# Patient Record
Sex: MALE | Race: WHITE | NOT HISPANIC OR LATINO
[De-identification: names, ages, dates, MRNs, and addresses within clinical notes are randomized per-mention and may not be internally consistent; named-entity substitution may affect disease eponyms.]

---

## 2017-01-23 ENCOUNTER — APPOINTMENT (OUTPATIENT)
Dept: CARDIOLOGY | Facility: CLINIC | Age: 82
End: 2017-01-23

## 2017-03-20 ENCOUNTER — APPOINTMENT (OUTPATIENT)
Dept: UROLOGY | Facility: CLINIC | Age: 82
End: 2017-03-20

## 2017-03-20 VITALS
SYSTOLIC BLOOD PRESSURE: 110 MMHG | HEIGHT: 72 IN | WEIGHT: 205 LBS | HEART RATE: 70 BPM | BODY MASS INDEX: 27.77 KG/M2 | DIASTOLIC BLOOD PRESSURE: 58 MMHG

## 2017-03-20 DIAGNOSIS — G40.909 EPILEPSY, UNSPECIFIED, NOT INTRACTABLE, W/OUT STATUS EPILEPTICUS: ICD-10-CM

## 2017-03-20 DIAGNOSIS — Z86.73 PERSONAL HISTORY OF TRANSIENT ISCHEMIC ATTACK (TIA), AND CEREBRAL INFARCTION W/OUT RESIDUAL DEFICITS: ICD-10-CM

## 2017-03-20 DIAGNOSIS — Z86.69 PERSONAL HISTORY OF OTHER DISEASES OF THE NERVOUS SYSTEM AND SENSE ORGANS: ICD-10-CM

## 2017-03-20 DIAGNOSIS — Z87.891 PERSONAL HISTORY OF NICOTINE DEPENDENCE: ICD-10-CM

## 2017-03-20 RX ORDER — ERGOCALCIFEROL 1.25 MG/1
1.25 MG CAPSULE, LIQUID FILLED ORAL
Qty: 4 | Refills: 0 | Status: DISCONTINUED | COMMUNITY
Start: 2016-10-07

## 2017-03-20 RX ORDER — ASPIRIN 81 MG
81 TABLET, DELAYED RELEASE (ENTERIC COATED) ORAL
Refills: 0 | Status: ACTIVE | COMMUNITY

## 2017-03-20 RX ORDER — LANCETS 28 GAUGE
EACH MISCELLANEOUS
Qty: 100 | Refills: 0 | Status: DISCONTINUED | COMMUNITY
Start: 2016-06-21

## 2017-03-20 RX ORDER — IPRATROPIUM BROMIDE 42 UG/1
0.06 SPRAY NASAL
Qty: 15 | Refills: 0 | Status: DISCONTINUED | COMMUNITY
Start: 2016-05-17

## 2017-03-20 RX ORDER — BLOOD SUGAR DIAGNOSTIC
STRIP MISCELLANEOUS
Qty: 100 | Refills: 0 | Status: DISCONTINUED | COMMUNITY
Start: 2016-06-21

## 2017-03-21 LAB
APPEARANCE: CLEAR
BACTERIA: ABNORMAL
BILIRUBIN URINE: NEGATIVE
BLOOD URINE: NEGATIVE
CALCIUM OXALATE CRYSTALS: ABNORMAL
COLOR: ABNORMAL
GLUCOSE QUALITATIVE U: NORMAL MG/DL
HYALINE CASTS: 0 /LPF
KETONES URINE: ABNORMAL
LEUKOCYTE ESTERASE URINE: NEGATIVE
MICROSCOPIC-UA: NORMAL
NITRITE URINE: NEGATIVE
PH URINE: 6.5
PROTEIN URINE: 30 MG/DL
RED BLOOD CELLS URINE: 2 /HPF
SPECIFIC GRAVITY URINE: 1.02
SQUAMOUS EPITHELIAL CELLS: 7 /HPF
UROBILINOGEN URINE: 1 MG/DL
WHITE BLOOD CELLS URINE: 2 /HPF

## 2017-03-22 LAB
BACTERIA UR CULT: ABNORMAL
CORE LAB FLUID CYTOLOGY: NORMAL

## 2017-04-13 ENCOUNTER — APPOINTMENT (OUTPATIENT)
Dept: UROLOGY | Facility: CLINIC | Age: 82
End: 2017-04-13

## 2017-04-26 ENCOUNTER — APPOINTMENT (OUTPATIENT)
Dept: CARDIOLOGY | Facility: CLINIC | Age: 82
End: 2017-04-26

## 2017-04-26 ENCOUNTER — MEDICATION RENEWAL (OUTPATIENT)
Age: 82
End: 2017-04-26

## 2017-05-01 ENCOUNTER — APPOINTMENT (OUTPATIENT)
Dept: CARDIOLOGY | Facility: CLINIC | Age: 82
End: 2017-05-01

## 2017-05-02 ENCOUNTER — MEDICATION RENEWAL (OUTPATIENT)
Age: 82
End: 2017-05-02

## 2017-05-31 ENCOUNTER — EMERGENCY (EMERGENCY)
Facility: HOSPITAL | Age: 82
LOS: 1 days | End: 2017-05-31
Payer: MEDICARE

## 2017-05-31 PROCEDURE — 71020: CPT | Mod: 26

## 2017-05-31 PROCEDURE — 70450 CT HEAD/BRAIN W/O DYE: CPT | Mod: 26

## 2017-05-31 PROCEDURE — 73562 X-RAY EXAM OF KNEE 3: CPT | Mod: 26,RT

## 2017-05-31 PROCEDURE — 99284 EMERGENCY DEPT VISIT MOD MDM: CPT

## 2017-07-06 ENCOUNTER — APPOINTMENT (OUTPATIENT)
Dept: UROLOGY | Facility: CLINIC | Age: 82
End: 2017-07-06

## 2017-08-02 ENCOUNTER — APPOINTMENT (OUTPATIENT)
Dept: CARDIOLOGY | Facility: CLINIC | Age: 82
End: 2017-08-02
Payer: MEDICARE

## 2017-08-02 PROCEDURE — ZZZZZ: CPT

## 2017-08-02 PROCEDURE — 85610 PROTHROMBIN TIME: CPT | Mod: QW

## 2017-08-02 PROCEDURE — 99214 OFFICE O/P EST MOD 30 MIN: CPT | Mod: 25

## 2017-08-02 PROCEDURE — 93289 INTERROG DEVICE EVAL HEART: CPT

## 2017-08-30 ENCOUNTER — APPOINTMENT (OUTPATIENT)
Dept: CARDIOLOGY | Facility: CLINIC | Age: 82
End: 2017-08-30

## 2017-09-21 ENCOUNTER — EMERGENCY (EMERGENCY)
Facility: HOSPITAL | Age: 82
LOS: 1 days | End: 2017-09-21
Payer: MEDICARE

## 2017-09-21 PROCEDURE — 74000: CPT | Mod: 26

## 2017-09-21 PROCEDURE — 99284 EMERGENCY DEPT VISIT MOD MDM: CPT

## 2017-10-09 ENCOUNTER — RECORD ABSTRACTING (OUTPATIENT)
Age: 82
End: 2017-10-09

## 2017-10-09 DIAGNOSIS — Z87.898 PERSONAL HISTORY OF OTHER SPECIFIED CONDITIONS: ICD-10-CM

## 2017-10-09 DIAGNOSIS — I25.2 OLD MYOCARDIAL INFARCTION: ICD-10-CM

## 2017-10-09 DIAGNOSIS — Z98.890 OTHER SPECIFIED POSTPROCEDURAL STATES: ICD-10-CM

## 2017-10-09 DIAGNOSIS — Z86.79 PERSONAL HISTORY OF OTHER DISEASES OF THE CIRCULATORY SYSTEM: ICD-10-CM

## 2017-10-09 DIAGNOSIS — Z86.39 PERSONAL HISTORY OF OTHER ENDOCRINE, NUTRITIONAL AND METABOLIC DISEASE: ICD-10-CM

## 2017-10-09 DIAGNOSIS — G30.9 ALZHEIMER'S DISEASE, UNSPECIFIED: ICD-10-CM

## 2017-10-30 ENCOUNTER — MEDICATION RENEWAL (OUTPATIENT)
Age: 82
End: 2017-10-30

## 2017-10-30 ENCOUNTER — APPOINTMENT (OUTPATIENT)
Dept: UROLOGY | Facility: CLINIC | Age: 82
End: 2017-10-30

## 2017-11-10 ENCOUNTER — RESULT REVIEW (OUTPATIENT)
Age: 82
End: 2017-11-10

## 2017-11-10 LAB
INR PPP: 4.38
PT BLD: 53.2

## 2017-11-13 ENCOUNTER — RESULT REVIEW (OUTPATIENT)
Age: 82
End: 2017-11-13

## 2017-11-14 LAB
INR PPP: 1.51
PT BLD: 17.8

## 2017-11-16 ENCOUNTER — RESULT REVIEW (OUTPATIENT)
Age: 82
End: 2017-11-16

## 2017-11-16 LAB
INR PPP: 2.3
PT BLD: 27.4

## 2017-11-17 ENCOUNTER — RESULT REVIEW (OUTPATIENT)
Age: 82
End: 2017-11-17

## 2017-11-22 ENCOUNTER — RESULT REVIEW (OUTPATIENT)
Age: 82
End: 2017-11-22

## 2017-11-22 LAB
INR PPP: 3.77
PT BLD: 45.6

## 2017-11-28 LAB
INR PPP: 1.78
PT BLD: 21

## 2017-11-29 ENCOUNTER — APPOINTMENT (OUTPATIENT)
Dept: CARDIOLOGY | Facility: CLINIC | Age: 82
End: 2017-11-29
Payer: MEDICARE

## 2017-11-29 LAB
INR PPP: 1.6 RATIO
POCT-PROTHROMBIN TIME: 19.7 SECS

## 2017-11-29 PROCEDURE — 85610 PROTHROMBIN TIME: CPT | Mod: QW

## 2017-12-07 ENCOUNTER — APPOINTMENT (OUTPATIENT)
Dept: CARDIOLOGY | Facility: CLINIC | Age: 82
End: 2017-12-07

## 2017-12-15 ENCOUNTER — APPOINTMENT (OUTPATIENT)
Dept: CARDIOLOGY | Facility: CLINIC | Age: 82
End: 2017-12-15
Payer: MEDICARE

## 2017-12-15 ENCOUNTER — RESULT REVIEW (OUTPATIENT)
Age: 82
End: 2017-12-15

## 2017-12-15 VITALS
DIASTOLIC BLOOD PRESSURE: 50 MMHG | SYSTOLIC BLOOD PRESSURE: 92 MMHG | HEIGHT: 74 IN | OXYGEN SATURATION: 99 % | BODY MASS INDEX: 28.23 KG/M2 | WEIGHT: 220 LBS | HEART RATE: 85 BPM

## 2017-12-15 PROCEDURE — 99214 OFFICE O/P EST MOD 30 MIN: CPT

## 2017-12-15 PROCEDURE — 93284 PRGRMG EVAL IMPLANTABLE DFB: CPT

## 2017-12-21 ENCOUNTER — APPOINTMENT (OUTPATIENT)
Dept: CARDIOLOGY | Facility: CLINIC | Age: 82
End: 2017-12-21
Payer: MEDICARE

## 2017-12-21 PROCEDURE — 85610 PROTHROMBIN TIME: CPT | Mod: QW

## 2017-12-27 ENCOUNTER — APPOINTMENT (OUTPATIENT)
Dept: CARDIOLOGY | Facility: CLINIC | Age: 82
End: 2017-12-27
Payer: MEDICARE

## 2017-12-27 PROCEDURE — 85610 PROTHROMBIN TIME: CPT | Mod: QW

## 2018-01-03 ENCOUNTER — APPOINTMENT (OUTPATIENT)
Dept: CARDIOLOGY | Facility: CLINIC | Age: 83
End: 2018-01-03
Payer: MEDICARE

## 2018-01-03 PROCEDURE — 85610 PROTHROMBIN TIME: CPT | Mod: QW

## 2018-01-07 ENCOUNTER — RX RENEWAL (OUTPATIENT)
Age: 83
End: 2018-01-07

## 2018-01-12 ENCOUNTER — RESULT REVIEW (OUTPATIENT)
Age: 83
End: 2018-01-12

## 2018-01-19 LAB — INR PPP: 2.24

## 2018-01-22 ENCOUNTER — RX RENEWAL (OUTPATIENT)
Age: 83
End: 2018-01-22

## 2018-02-02 LAB — INR PPP: 1.85

## 2018-02-06 ENCOUNTER — MEDICATION RENEWAL (OUTPATIENT)
Age: 83
End: 2018-02-06

## 2018-02-12 ENCOUNTER — RX RENEWAL (OUTPATIENT)
Age: 83
End: 2018-02-12

## 2018-03-10 ENCOUNTER — APPOINTMENT (OUTPATIENT)
Dept: UROLOGY | Facility: CLINIC | Age: 83
End: 2018-03-10

## 2018-03-12 ENCOUNTER — APPOINTMENT (OUTPATIENT)
Dept: CARDIOLOGY | Facility: CLINIC | Age: 83
End: 2018-03-12
Payer: MEDICARE

## 2018-03-12 PROCEDURE — 93284 PRGRMG EVAL IMPLANTABLE DFB: CPT

## 2018-03-16 ENCOUNTER — RESULT REVIEW (OUTPATIENT)
Age: 83
End: 2018-03-16

## 2018-03-16 ENCOUNTER — APPOINTMENT (OUTPATIENT)
Dept: CARDIOLOGY | Facility: CLINIC | Age: 83
End: 2018-03-16
Payer: MEDICARE

## 2018-03-16 PROCEDURE — 85610 PROTHROMBIN TIME: CPT | Mod: QW

## 2018-03-19 ENCOUNTER — APPOINTMENT (OUTPATIENT)
Dept: CARDIOLOGY | Facility: CLINIC | Age: 83
End: 2018-03-19
Payer: MEDICARE

## 2018-03-19 ENCOUNTER — RESULT REVIEW (OUTPATIENT)
Age: 83
End: 2018-03-19

## 2018-03-19 LAB
INR PPP: 2.2 RATIO
INR PPP: 4.43

## 2018-03-19 PROCEDURE — 85610 PROTHROMBIN TIME: CPT | Mod: QW

## 2018-03-26 ENCOUNTER — APPOINTMENT (OUTPATIENT)
Dept: CARDIOLOGY | Facility: CLINIC | Age: 83
End: 2018-03-26
Payer: MEDICARE

## 2018-03-26 PROCEDURE — 85610 PROTHROMBIN TIME: CPT | Mod: QW

## 2018-04-24 ENCOUNTER — APPOINTMENT (OUTPATIENT)
Dept: CARDIOLOGY | Facility: CLINIC | Age: 83
End: 2018-04-24
Payer: MEDICARE

## 2018-04-24 LAB — INR PPP: 2.6

## 2018-04-24 PROCEDURE — 85610 PROTHROMBIN TIME: CPT | Mod: QW

## 2018-04-28 ENCOUNTER — APPOINTMENT (OUTPATIENT)
Dept: UROLOGY | Facility: CLINIC | Age: 83
End: 2018-04-28

## 2018-04-30 ENCOUNTER — MEDICATION RENEWAL (OUTPATIENT)
Age: 83
End: 2018-04-30

## 2018-05-01 ENCOUNTER — RX RENEWAL (OUTPATIENT)
Age: 83
End: 2018-05-01

## 2018-05-07 ENCOUNTER — RX RENEWAL (OUTPATIENT)
Age: 83
End: 2018-05-07

## 2018-05-17 ENCOUNTER — RESULT REVIEW (OUTPATIENT)
Age: 83
End: 2018-05-17

## 2018-06-03 ENCOUNTER — MEDICATION RENEWAL (OUTPATIENT)
Age: 83
End: 2018-06-03

## 2018-06-11 ENCOUNTER — APPOINTMENT (OUTPATIENT)
Dept: CARDIOLOGY | Facility: CLINIC | Age: 83
End: 2018-06-11

## 2018-06-12 ENCOUNTER — APPOINTMENT (OUTPATIENT)
Dept: CARDIOLOGY | Facility: CLINIC | Age: 83
End: 2018-06-12

## 2018-06-15 ENCOUNTER — NON-APPOINTMENT (OUTPATIENT)
Age: 83
End: 2018-06-15

## 2018-06-15 ENCOUNTER — APPOINTMENT (OUTPATIENT)
Dept: CARDIOLOGY | Facility: CLINIC | Age: 83
End: 2018-06-15
Payer: MEDICARE

## 2018-06-15 VITALS
SYSTOLIC BLOOD PRESSURE: 118 MMHG | DIASTOLIC BLOOD PRESSURE: 60 MMHG | BODY MASS INDEX: 31.83 KG/M2 | HEART RATE: 64 BPM | WEIGHT: 210 LBS | HEIGHT: 68 IN

## 2018-06-15 LAB
INR PPP: 1.4 RATIO
INR PPP: 1.4 RATIO
INR PPP: 1.59
INR PPP: 1.75
INR PPP: 1.8
INR PPP: 1.86
INR PPP: 1.93 RATIO
INR PPP: 2
INR PPP: 2.03
INR PPP: 2.03
INR PPP: 2.08
INR PPP: 2.1 RATIO
INR PPP: 2.2 RATIO
INR PPP: 2.29
INR PPP: 2.55
INR PPP: 2.74
INR PPP: 2.8
INR PPP: 2.85
INR PPP: 2.9 RATIO
INR PPP: 2.92
INR PPP: 2.95
INR PPP: 2.97
INR PPP: 3.61
INR PPP: 3.7
INR PPP: 3.76
INR PPP: 4.07
PT BLD: 44.7
QUALITY CONTROL: YES

## 2018-06-15 PROCEDURE — 93000 ELECTROCARDIOGRAM COMPLETE: CPT

## 2018-06-15 PROCEDURE — 85610 PROTHROMBIN TIME: CPT | Mod: QW

## 2018-06-18 ENCOUNTER — APPOINTMENT (OUTPATIENT)
Dept: CARDIOLOGY | Facility: CLINIC | Age: 83
End: 2018-06-18
Payer: MEDICARE

## 2018-06-18 LAB
INR PPP: 1.3 RATIO
QUALITY CONTROL: YES

## 2018-06-18 PROCEDURE — 85610 PROTHROMBIN TIME: CPT | Mod: QW

## 2018-06-21 LAB — INR PPP: 1.11

## 2018-06-23 ENCOUNTER — APPOINTMENT (OUTPATIENT)
Dept: UROLOGY | Facility: CLINIC | Age: 83
End: 2018-06-23
Payer: MEDICARE

## 2018-06-23 VITALS
TEMPERATURE: 98.2 F | HEIGHT: 72 IN | WEIGHT: 210 LBS | DIASTOLIC BLOOD PRESSURE: 57 MMHG | RESPIRATION RATE: 16 BRPM | SYSTOLIC BLOOD PRESSURE: 93 MMHG | HEART RATE: 80 BPM | BODY MASS INDEX: 28.44 KG/M2

## 2018-06-23 PROCEDURE — 99214 OFFICE O/P EST MOD 30 MIN: CPT | Mod: 25

## 2018-06-23 PROCEDURE — 51798 US URINE CAPACITY MEASURE: CPT

## 2018-06-23 RX ORDER — NEOMYCIN SULFATE, POLYMYXIN B SULFATE AND HYDROCORTISONE 3.5; 10000; 1 MG/ML; [USP'U]/ML; MG/ML
3.5-10000-1 SUSPENSION OPHTHALMIC
Qty: 8 | Refills: 0 | Status: COMPLETED | COMMUNITY
Start: 2018-04-13

## 2018-06-23 RX ORDER — CALCITRIOL 0.5 UG/1
0.5 CAPSULE, LIQUID FILLED ORAL
Qty: 90 | Refills: 0 | Status: ACTIVE | COMMUNITY
Start: 2018-03-09

## 2018-06-23 RX ORDER — ERYTHROMYCIN 5 MG/G
5 OINTMENT OPHTHALMIC
Qty: 4 | Refills: 0 | Status: COMPLETED | COMMUNITY
Start: 2018-03-10

## 2018-06-23 RX ORDER — OXYBUTYNIN CHLORIDE 10 MG/1
10 TABLET, EXTENDED RELEASE ORAL
Qty: 90 | Refills: 0 | Status: COMPLETED | COMMUNITY
Start: 2017-03-20 | End: 2018-06-23

## 2018-06-23 RX ORDER — TOBRAMYCIN AND DEXAMETHASONE 3; 1 MG/ML; MG/ML
0.3-0.1 SUSPENSION/ DROPS OPHTHALMIC
Qty: 5 | Refills: 0 | Status: COMPLETED | COMMUNITY
Start: 2018-06-20

## 2018-06-23 RX ORDER — CEPHALEXIN 250 MG/1
250 CAPSULE ORAL
Qty: 21 | Refills: 0 | Status: COMPLETED | COMMUNITY
Start: 2018-04-12

## 2018-06-28 LAB — INR PPP: 1.54

## 2018-07-05 LAB — INR PPP: 1.79

## 2018-07-09 ENCOUNTER — APPOINTMENT (OUTPATIENT)
Dept: CARDIOLOGY | Facility: CLINIC | Age: 83
End: 2018-07-09
Payer: MEDICARE

## 2018-07-09 LAB — INR PPP: 2.5

## 2018-07-09 PROCEDURE — 85610 PROTHROMBIN TIME: CPT | Mod: QW

## 2018-07-09 PROCEDURE — 93284 PRGRMG EVAL IMPLANTABLE DFB: CPT

## 2018-07-13 LAB — INR PPP: 2.27

## 2018-07-14 ENCOUNTER — APPOINTMENT (OUTPATIENT)
Dept: UROLOGY | Facility: CLINIC | Age: 83
End: 2018-07-14

## 2018-07-18 ENCOUNTER — OUTPATIENT (OUTPATIENT)
Dept: OUTPATIENT SERVICES | Facility: HOSPITAL | Age: 83
LOS: 1 days | End: 2018-07-18
Payer: MEDICARE

## 2018-07-18 PROCEDURE — 71046 X-RAY EXAM CHEST 2 VIEWS: CPT | Mod: 26

## 2018-07-19 LAB — INR PPP: 2.41

## 2018-07-20 ENCOUNTER — APPOINTMENT (OUTPATIENT)
Dept: UROLOGY | Facility: CLINIC | Age: 83
End: 2018-07-20

## 2018-07-25 PROBLEM — Z86.73 HISTORY OF STROKE: Status: RESOLVED | Noted: 2017-03-20 | Resolved: 2018-07-25

## 2018-07-26 LAB — INR PPP: 3.58

## 2018-08-02 LAB — INR PPP: 3.3

## 2018-08-03 ENCOUNTER — MEDICATION RENEWAL (OUTPATIENT)
Age: 83
End: 2018-08-03

## 2018-08-09 LAB — INR PPP: 1.77

## 2018-08-16 LAB — INR PPP: 2.42

## 2018-08-23 LAB — INR PPP: 2.67

## 2018-08-27 ENCOUNTER — EMERGENCY (EMERGENCY)
Facility: HOSPITAL | Age: 83
LOS: 1 days | End: 2018-08-27
Payer: MEDICARE

## 2018-08-27 PROCEDURE — 70450 CT HEAD/BRAIN W/O DYE: CPT | Mod: 26

## 2018-08-27 PROCEDURE — 72125 CT NECK SPINE W/O DYE: CPT | Mod: 26

## 2018-08-27 PROCEDURE — 73562 X-RAY EXAM OF KNEE 3: CPT | Mod: 26,LT

## 2018-08-27 PROCEDURE — 71046 X-RAY EXAM CHEST 2 VIEWS: CPT | Mod: 26

## 2018-08-27 PROCEDURE — 99285 EMERGENCY DEPT VISIT HI MDM: CPT

## 2018-08-28 LAB — INR PPP: 1.41

## 2018-08-31 LAB — INR PPP: 1.76

## 2018-09-06 LAB — INR PPP: 1.64

## 2018-09-13 LAB — INR PPP: 1.88

## 2018-09-20 LAB — INR PPP: 2.63

## 2018-09-28 LAB — INR PPP: 3.07

## 2018-10-05 LAB — INR PPP: 2.55

## 2018-10-11 LAB — INR PPP: 3.4

## 2018-10-15 ENCOUNTER — APPOINTMENT (OUTPATIENT)
Dept: CARDIOLOGY | Facility: CLINIC | Age: 83
End: 2018-10-15
Payer: MEDICARE

## 2018-10-15 PROCEDURE — 93284 PRGRMG EVAL IMPLANTABLE DFB: CPT

## 2018-10-19 ENCOUNTER — APPOINTMENT (OUTPATIENT)
Dept: UROLOGY | Facility: CLINIC | Age: 83
End: 2018-10-19

## 2018-10-22 LAB — INR PPP: 3.36

## 2018-10-26 LAB — INR PPP: 2.28

## 2018-11-02 ENCOUNTER — RESULT REVIEW (OUTPATIENT)
Age: 83
End: 2018-11-02

## 2018-11-02 LAB — INR PPP: 1.92

## 2018-11-09 ENCOUNTER — RESULT REVIEW (OUTPATIENT)
Age: 83
End: 2018-11-09

## 2018-11-09 LAB — INR PPP: 2.56

## 2018-11-16 LAB — INR PPP: 2.09

## 2018-11-21 LAB — INR PPP: 2.24

## 2018-11-29 LAB — INR PPP: 2.31

## 2018-12-03 ENCOUNTER — MEDICATION RENEWAL (OUTPATIENT)
Age: 83
End: 2018-12-03

## 2018-12-04 ENCOUNTER — RX RENEWAL (OUTPATIENT)
Age: 83
End: 2018-12-04

## 2018-12-06 LAB — INR PPP: 2.37

## 2018-12-10 ENCOUNTER — APPOINTMENT (OUTPATIENT)
Dept: CARDIOLOGY | Facility: CLINIC | Age: 83
End: 2018-12-10
Payer: MEDICARE

## 2018-12-10 VITALS
HEIGHT: 72 IN | WEIGHT: 210 LBS | OXYGEN SATURATION: 99 % | HEART RATE: 72 BPM | SYSTOLIC BLOOD PRESSURE: 124 MMHG | BODY MASS INDEX: 28.44 KG/M2 | DIASTOLIC BLOOD PRESSURE: 60 MMHG

## 2018-12-10 PROCEDURE — 99214 OFFICE O/P EST MOD 30 MIN: CPT

## 2018-12-10 NOTE — HISTORY OF PRESENT ILLNESS
[FreeTextEntry1] : A FIB: Rate is well-controlled. Patient is tolerating anticoagulation.\par \par Hyperlipidemia: The patient is tolerating statin therapy.\par \par Hypertension: Well controlled.\par \par Cardiomyopathy: The patient has been stable for quite some time. He is asymptomatic.  No signs or symptoms of heart failure.  No arrhythmias noted on device interrogation.  The patient has has significant dementia. Overall elected to continue current pharmacologic therapy.

## 2018-12-10 NOTE — PHYSICAL EXAM
[General Appearance - Well Developed] : well developed [Normal Appearance] : normal appearance [Well Groomed] : well groomed [General Appearance - Well Nourished] : well nourished [No Deformities] : no deformities [General Appearance - In No Acute Distress] : no acute distress [Normal Conjunctiva] : the conjunctiva exhibited no abnormalities [Eyelids - No Xanthelasma] : the eyelids demonstrated no xanthelasmas [Normal Oral Mucosa] : normal oral mucosa [No Oral Pallor] : no oral pallor [No Oral Cyanosis] : no oral cyanosis [Normal Jugular Venous A Waves Present] : normal jugular venous A waves present [Normal Jugular Venous V Waves Present] : normal jugular venous V waves present [No Jugular Venous Ballesteros A Waves] : no jugular venous ballesteros A waves [] : no respiratory distress [Respiration, Rhythm And Depth] : normal respiratory rhythm and effort [Exaggerated Use Of Accessory Muscles For Inspiration] : no accessory muscle use [Auscultation Breath Sounds / Voice Sounds] : lungs were clear to auscultation bilaterally [Heart Rate And Rhythm] : heart rate and rhythm were normal [Heart Sounds] : normal S1 and S2 [Murmurs] : no murmurs present

## 2018-12-14 LAB — INR PPP: 3.23

## 2018-12-21 ENCOUNTER — RESULT REVIEW (OUTPATIENT)
Age: 83
End: 2018-12-21

## 2018-12-21 LAB — INR PPP: 2.97

## 2018-12-28 ENCOUNTER — RESULT REVIEW (OUTPATIENT)
Age: 83
End: 2018-12-28

## 2018-12-28 LAB
INR PPP: 2.17
INR PPP: 2.74

## 2018-12-29 ENCOUNTER — RX RENEWAL (OUTPATIENT)
Age: 83
End: 2018-12-29

## 2019-01-01 ENCOUNTER — APPOINTMENT (OUTPATIENT)
Dept: CARDIOLOGY | Facility: CLINIC | Age: 84
End: 2019-01-01
Payer: MEDICARE

## 2019-01-01 ENCOUNTER — APPOINTMENT (OUTPATIENT)
Dept: CARDIOLOGY | Facility: CLINIC | Age: 84
End: 2019-01-01

## 2019-01-01 ENCOUNTER — RX RENEWAL (OUTPATIENT)
Age: 84
End: 2019-01-01

## 2019-01-01 ENCOUNTER — CLINICAL ADVICE (OUTPATIENT)
Age: 84
End: 2019-01-01

## 2019-01-01 VITALS
SYSTOLIC BLOOD PRESSURE: 142 MMHG | DIASTOLIC BLOOD PRESSURE: 80 MMHG | OXYGEN SATURATION: 100 % | HEART RATE: 68 BPM | HEIGHT: 72 IN | BODY MASS INDEX: 25.73 KG/M2 | WEIGHT: 190 LBS

## 2019-01-01 DIAGNOSIS — E78.5 HYPERLIPIDEMIA, UNSPECIFIED: ICD-10-CM

## 2019-01-01 LAB
INR PPP: 1.45
INR PPP: 1.7
INR PPP: 1.73
INR PPP: 2.65
INR PPP: 2.94
INR PPP: 2.97
INR PPP: 2.98
INR PPP: 3.01
INR PPP: 3.57
INR PPP: 4.26

## 2019-01-01 PROCEDURE — 99214 OFFICE O/P EST MOD 30 MIN: CPT

## 2019-01-01 PROCEDURE — 85610 PROTHROMBIN TIME: CPT | Mod: QW

## 2019-01-02 ENCOUNTER — RX RENEWAL (OUTPATIENT)
Age: 84
End: 2019-01-02

## 2019-01-03 ENCOUNTER — RESULT REVIEW (OUTPATIENT)
Age: 84
End: 2019-01-03

## 2019-01-03 LAB — INR PPP: 3.71

## 2019-01-04 ENCOUNTER — RX RENEWAL (OUTPATIENT)
Age: 84
End: 2019-01-04

## 2019-01-10 ENCOUNTER — RESULT REVIEW (OUTPATIENT)
Age: 84
End: 2019-01-10

## 2019-01-10 LAB — INR PPP: 3.59

## 2019-01-18 ENCOUNTER — RESULT REVIEW (OUTPATIENT)
Age: 84
End: 2019-01-18

## 2019-01-18 LAB — INR PPP: 2.72

## 2019-01-22 ENCOUNTER — MEDICATION RENEWAL (OUTPATIENT)
Age: 84
End: 2019-01-22

## 2019-01-24 LAB — INR PPP: 1.99

## 2019-01-30 ENCOUNTER — RX RENEWAL (OUTPATIENT)
Age: 84
End: 2019-01-30

## 2019-01-31 ENCOUNTER — RESULT REVIEW (OUTPATIENT)
Age: 84
End: 2019-01-31

## 2019-02-01 LAB — INR PPP: 1.56

## 2019-02-05 ENCOUNTER — APPOINTMENT (OUTPATIENT)
Dept: CARDIOLOGY | Facility: CLINIC | Age: 84
End: 2019-02-05

## 2019-02-08 ENCOUNTER — RESULT REVIEW (OUTPATIENT)
Age: 84
End: 2019-02-08

## 2019-02-08 LAB — INR PPP: 1.68

## 2019-02-14 LAB — INR PPP: 2.21

## 2019-02-22 ENCOUNTER — APPOINTMENT (OUTPATIENT)
Dept: CARDIOLOGY | Facility: CLINIC | Age: 84
End: 2019-02-22
Payer: MEDICARE

## 2019-02-22 LAB
INR PPP: 2.1 RATIO
QUALITY CONTROL: YES

## 2019-02-22 PROCEDURE — 85610 PROTHROMBIN TIME: CPT | Mod: QW

## 2019-02-28 LAB — INR PPP: 2.59

## 2019-03-02 PROCEDURE — 73562 X-RAY EXAM OF KNEE 3: CPT | Mod: 26,RT

## 2019-03-02 PROCEDURE — 99285 EMERGENCY DEPT VISIT HI MDM: CPT | Mod: 25

## 2019-03-02 PROCEDURE — 71045 X-RAY EXAM CHEST 1 VIEW: CPT | Mod: 26

## 2019-03-03 ENCOUNTER — INPATIENT (INPATIENT)
Facility: HOSPITAL | Age: 84
LOS: 4 days | Discharge: NURSING, REHAB OR LONG-TERM CARE | End: 2019-03-08
Payer: MEDICARE

## 2019-03-03 ENCOUNTER — OUTPATIENT (OUTPATIENT)
Dept: OUTPATIENT SERVICES | Facility: HOSPITAL | Age: 84
LOS: 1 days | End: 2019-03-03

## 2019-03-03 PROCEDURE — 70450 CT HEAD/BRAIN W/O DYE: CPT | Mod: 26

## 2019-03-03 PROCEDURE — 72125 CT NECK SPINE W/O DYE: CPT | Mod: 26

## 2019-03-04 ENCOUNTER — APPOINTMENT (OUTPATIENT)
Dept: UROLOGY | Facility: CLINIC | Age: 84
End: 2019-03-04

## 2019-03-04 ENCOUNTER — OUTPATIENT (OUTPATIENT)
Dept: OUTPATIENT SERVICES | Facility: HOSPITAL | Age: 84
LOS: 1 days | End: 2019-03-04

## 2019-03-05 ENCOUNTER — OUTPATIENT (OUTPATIENT)
Dept: OUTPATIENT SERVICES | Facility: HOSPITAL | Age: 84
LOS: 1 days | End: 2019-03-05

## 2019-03-06 ENCOUNTER — OUTPATIENT (OUTPATIENT)
Dept: OUTPATIENT SERVICES | Facility: HOSPITAL | Age: 84
LOS: 1 days | End: 2019-03-06

## 2019-03-09 ENCOUNTER — OUTPATIENT (OUTPATIENT)
Dept: OUTPATIENT SERVICES | Facility: HOSPITAL | Age: 84
LOS: 1 days | End: 2019-03-09

## 2019-03-11 ENCOUNTER — OUTPATIENT (OUTPATIENT)
Dept: OUTPATIENT SERVICES | Facility: HOSPITAL | Age: 84
LOS: 1 days | End: 2019-03-11

## 2019-03-14 ENCOUNTER — OUTPATIENT (OUTPATIENT)
Dept: OUTPATIENT SERVICES | Facility: HOSPITAL | Age: 84
LOS: 1 days | End: 2019-03-14

## 2019-03-21 ENCOUNTER — OUTPATIENT (OUTPATIENT)
Dept: OUTPATIENT SERVICES | Facility: HOSPITAL | Age: 84
LOS: 1 days | End: 2019-03-21

## 2019-03-22 ENCOUNTER — OUTPATIENT (OUTPATIENT)
Dept: OUTPATIENT SERVICES | Facility: HOSPITAL | Age: 84
LOS: 1 days | End: 2019-03-22

## 2019-03-25 ENCOUNTER — OUTPATIENT (OUTPATIENT)
Dept: OUTPATIENT SERVICES | Facility: HOSPITAL | Age: 84
LOS: 1 days | End: 2019-03-25

## 2019-03-26 ENCOUNTER — OUTPATIENT (OUTPATIENT)
Dept: OUTPATIENT SERVICES | Facility: HOSPITAL | Age: 84
LOS: 1 days | End: 2019-03-26

## 2019-03-27 LAB — INR PPP: 2.07

## 2019-04-05 LAB — INR PPP: 2.27

## 2019-04-08 ENCOUNTER — APPOINTMENT (OUTPATIENT)
Dept: CARDIOLOGY | Facility: CLINIC | Age: 84
End: 2019-04-08

## 2019-04-12 LAB — INR PPP: 1.97

## 2019-04-18 ENCOUNTER — APPOINTMENT (OUTPATIENT)
Dept: CARDIOLOGY | Facility: CLINIC | Age: 84
End: 2019-04-18
Payer: MEDICARE

## 2019-04-18 ENCOUNTER — OTHER (OUTPATIENT)
Age: 84
End: 2019-04-18

## 2019-04-18 VITALS
DIASTOLIC BLOOD PRESSURE: 74 MMHG | OXYGEN SATURATION: 99 % | HEART RATE: 70 BPM | WEIGHT: 200 LBS | SYSTOLIC BLOOD PRESSURE: 162 MMHG | BODY MASS INDEX: 27.13 KG/M2

## 2019-04-18 LAB — INR PPP: 3.39

## 2019-04-18 PROCEDURE — 93284 PRGRMG EVAL IMPLANTABLE DFB: CPT

## 2019-04-18 PROCEDURE — 99214 OFFICE O/P EST MOD 30 MIN: CPT

## 2019-04-18 NOTE — PHYSICAL EXAM
[General Appearance - Well Developed] : well developed [Normal Appearance] : normal appearance [Well Groomed] : well groomed [General Appearance - Well Nourished] : well nourished [No Deformities] : no deformities [General Appearance - In No Acute Distress] : no acute distress [No Oral Pallor] : no oral pallor [No Oral Cyanosis] : no oral cyanosis

## 2019-04-18 NOTE — PROCEDURE
[No] : not [Sinus Bradycardia] : sinus bradycardia [ICD] : Implantable cardioverter-defibrillator [Charge Time: ___ sec] : charge time was [unfilled] seconds [VVIR] : VVIR [Longevity: ___ months] : The estimated remaining battery life is [unfilled] months [Sensing Amplitude ___mv] : sensing amplitude was [unfilled] mv [Lead Imp:  ___ohms] : lead impedance was [unfilled] ohms [___V @] : [unfilled] V [___ ms] : [unfilled] ms [Pace ___ %] : Pace [unfilled]% [de-identified] : 3231-40Q [de-identified] : JEIMY [de-identified] : [id number] [de-identified] : 70 [de-identified] : 5/17/11 [de-identified] : HV impedance 42\par \par No arrhythmias.\par \par Next check 2 months as device is nearing TIANA.

## 2019-04-18 NOTE — PHYSICAL EXAM
[General Appearance - Well Developed] : well developed [Normal Appearance] : normal appearance [Well Groomed] : well groomed [No Deformities] : no deformities [General Appearance - Well Nourished] : well nourished [General Appearance - In No Acute Distress] : no acute distress [FreeTextEntry1] : 162/74, 70, 14 [Normal Conjunctiva] : the conjunctiva exhibited no abnormalities [Eyelids - No Xanthelasma] : the eyelids demonstrated no xanthelasmas [Normal Oral Mucosa] : normal oral mucosa [No Oral Pallor] : no oral pallor [Normal Jugular Venous A Waves Present] : normal jugular venous A waves present [No Oral Cyanosis] : no oral cyanosis [Normal Jugular Venous V Waves Present] : normal jugular venous V waves present [No Jugular Venous Ballesteros A Waves] : no jugular venous ballesteros A waves [] : no respiratory distress [Respiration, Rhythm And Depth] : normal respiratory rhythm and effort [Exaggerated Use Of Accessory Muscles For Inspiration] : no accessory muscle use [Heart Rate And Rhythm] : heart rate and rhythm were normal [Auscultation Breath Sounds / Voice Sounds] : lungs were clear to auscultation bilaterally [Heart Sounds] : normal S1 and S2 [Murmurs] : no murmurs present

## 2019-04-18 NOTE — ASSESSMENT
[FreeTextEntry1] : CAD: Patient status post coronary stenting. Patient has no exertional chest discomfort or shortness of breath.\par \par Atrial fibrillation: Patient is tolerating  A/C  therapy without difficulty.\par \par Hyperlipidemia: Continue statin therapy.\par \par Hypertension: Today I increased his metoprolol tartrate 25 p.o. b.i.d. up  to 50 mg p.o.  BID   The patient wishes to f/u bp w primary care.

## 2019-04-18 NOTE — REASON FOR VISIT
[Follow-up Device Check] : follow-up device check visit [Follow-Up - Clinic] : a clinic follow-up of [FreeTextEntry2] : 4 month check

## 2019-04-24 ENCOUNTER — RX RENEWAL (OUTPATIENT)
Age: 84
End: 2019-04-24

## 2019-04-24 RX ORDER — DIGOXIN 125 UG/1
125 TABLET ORAL
Qty: 90 | Refills: 0 | Status: DISCONTINUED | COMMUNITY
Start: 2018-01-07 | End: 2019-04-24

## 2019-04-26 LAB — INR PPP: 2.56

## 2019-05-02 LAB — INR PPP: 2.96

## 2019-05-10 LAB — INR PPP: 2.69

## 2019-05-16 LAB — INR PPP: 3.2

## 2019-05-20 ENCOUNTER — APPOINTMENT (OUTPATIENT)
Dept: UROLOGY | Facility: CLINIC | Age: 84
End: 2019-05-20
Payer: MEDICARE

## 2019-05-23 LAB — INR PPP: 1.77

## 2019-05-24 ENCOUNTER — APPOINTMENT (OUTPATIENT)
Dept: UROLOGY | Facility: CLINIC | Age: 84
End: 2019-05-24
Payer: MEDICARE

## 2019-05-24 VITALS
BODY MASS INDEX: 26.55 KG/M2 | DIASTOLIC BLOOD PRESSURE: 55 MMHG | HEART RATE: 72 BPM | WEIGHT: 196 LBS | HEIGHT: 72 IN | TEMPERATURE: 98.1 F | SYSTOLIC BLOOD PRESSURE: 93 MMHG

## 2019-05-24 PROCEDURE — 99213 OFFICE O/P EST LOW 20 MIN: CPT | Mod: 25

## 2019-05-24 PROCEDURE — 51798 US URINE CAPACITY MEASURE: CPT

## 2019-05-24 NOTE — HISTORY OF PRESENT ILLNESS
[FreeTextEntry1] : HPI: Mr Ryan, a 86 y.o gentleman with Alzheimers, hx of LUTS, currently on Oxybutynin ER 10 mg , doing ok, but dtr Juliet reports nocturia X 8 , denies incontinence. Evaluation of Voiding Symptoms:He is not able to answer questions related to his voiding, repeats question as is asked, but dtr believes no voiding ss. \par \par Today pt comes in having episodes of urinary incontinence during the day.  This has been going on for a couple of weeks. \par \par PVR 0\par

## 2019-05-24 NOTE — ASSESSMENT
[FreeTextEntry1] : Today pt comes in having episodes of urinary incontinence during the day.  This has been going on for a couple of weeks. \par \par Pt unable to give a urine sample\par \par PVR 0\par \par Plan\par cw trospium\par start flomax\par wear depends as needed\par fu 4-6 weeks

## 2019-05-24 NOTE — PHYSICAL EXAM
[Normal Appearance] : normal appearance [General Appearance - Well Developed] : well developed [General Appearance - Well Nourished] : well nourished [Abdomen Soft] : soft [General Appearance - In No Acute Distress] : no acute distress [Well Groomed] : well groomed [Abdomen Tenderness] : non-tender [Costovertebral Angle Tenderness] : no ~M costovertebral angle tenderness [Urinary Bladder Findings] : the bladder was normal on palpation [Edema] : no peripheral edema [Respiration, Rhythm And Depth] : normal respiratory rhythm and effort [] : no respiratory distress [Exaggerated Use Of Accessory Muscles For Inspiration] : no accessory muscle use [Not Anxious] : not anxious [Affect] : the affect was normal [Oriented To Time, Place, And Person] : oriented to person, place, and time [Mood] : the mood was normal [No Focal Deficits] : no focal deficits [Normal Station and Gait] : the gait and station were normal for the patient's age [FreeTextEntry1] : LUIS deferred

## 2019-05-28 LAB
APPEARANCE: CLEAR
BACTERIA UR CULT: NORMAL
BACTERIA: NEGATIVE
BILIRUBIN URINE: NEGATIVE
BLOOD URINE: NEGATIVE
COLOR: YELLOW
GLUCOSE QUALITATIVE U: NEGATIVE
HYALINE CASTS: 1 /LPF
KETONES URINE: NEGATIVE
LEUKOCYTE ESTERASE URINE: NEGATIVE
MICROSCOPIC-UA: NORMAL
NITRITE URINE: NEGATIVE
PH URINE: 7
PROTEIN URINE: ABNORMAL
RED BLOOD CELLS URINE: 3 /HPF
SPECIFIC GRAVITY URINE: 1.02
SQUAMOUS EPITHELIAL CELLS: 2 /HPF
UROBILINOGEN URINE: NORMAL
WHITE BLOOD CELLS URINE: 1 /HPF

## 2019-05-31 LAB — INR PPP: 1.63

## 2019-06-03 ENCOUNTER — APPOINTMENT (OUTPATIENT)
Age: 84
End: 2019-06-03
Payer: MEDICARE

## 2019-06-03 VITALS
DIASTOLIC BLOOD PRESSURE: 77 MMHG | TEMPERATURE: 98 F | HEIGHT: 72 IN | WEIGHT: 196 LBS | BODY MASS INDEX: 26.55 KG/M2 | HEART RATE: 71 BPM | SYSTOLIC BLOOD PRESSURE: 135 MMHG

## 2019-06-03 PROCEDURE — 99213 OFFICE O/P EST LOW 20 MIN: CPT

## 2019-06-03 NOTE — PHYSICAL EXAM
[General Appearance - Well Nourished] : well nourished [Normal Appearance] : normal appearance [General Appearance - Well Developed] : well developed [General Appearance - In No Acute Distress] : no acute distress [Well Groomed] : well groomed [Abdomen Tenderness] : non-tender [Abdomen Soft] : soft [Urinary Bladder Findings] : the bladder was normal on palpation [Costovertebral Angle Tenderness] : no ~M costovertebral angle tenderness [Edema] : no peripheral edema [] : no respiratory distress [Respiration, Rhythm And Depth] : normal respiratory rhythm and effort [Exaggerated Use Of Accessory Muscles For Inspiration] : no accessory muscle use [Oriented To Time, Place, And Person] : oriented to person, place, and time [Affect] : the affect was normal [Mood] : the mood was normal [Not Anxious] : not anxious [No Focal Deficits] : no focal deficits [Normal Station and Gait] : the gait and station were normal for the patient's age [FreeTextEntry1] : LUIS deferred

## 2019-06-03 NOTE — HISTORY OF PRESENT ILLNESS
[FreeTextEntry1] : HPI: Mr Ryan, a 86 y.o gentleman with Alzheimers, hx of LUTS, currently on Oxybutynin ER 10 mg , doing ok, but dtr Juliet reports nocturia X 8 , denies incontinence. Evaluation of Voiding Symptoms:He is not able to answer questions related to his voiding, repeats question as is asked, but dtr believes no voiding ss. \par \par Pt comes in follow up urinary incontinence. Pts wife it has been much better since pt instructed to urinates every hour.\par \par PVR 99. Pt at this time he was not able to void \par

## 2019-06-03 NOTE — ASSESSMENT
[FreeTextEntry1] : Pt comes in follow up urinary incontinence. Pts wife it has been much better since pt instructed to urinates every hour.\par \par PVR 99. Pt at this time he was not able to void \par \par Plan\par cw trospium\par cw flomax\par wear depends as needed\par fu 2 weeks

## 2019-06-06 LAB — INR PPP: 1.77

## 2019-06-13 LAB — INR PPP: 2.04

## 2019-06-20 LAB — INR PPP: 2.03

## 2019-06-26 ENCOUNTER — APPOINTMENT (OUTPATIENT)
Dept: CARDIOLOGY | Facility: CLINIC | Age: 84
End: 2019-06-26
Payer: MEDICARE

## 2019-06-26 LAB
INR PPP: 2.3 RATIO
QUALITY CONTROL: YES

## 2019-06-26 PROCEDURE — 93283 PRGRMG EVAL IMPLANTABLE DFB: CPT

## 2019-06-30 ENCOUNTER — RX RENEWAL (OUTPATIENT)
Age: 84
End: 2019-06-30

## 2019-06-30 NOTE — PROCEDURE
[No] : not [Sinus Bradycardia] : sinus bradycardia [ICD] : Implantable cardioverter-defibrillator [VVIR] : VVIR [Charge Time: ___ sec] : charge time was [unfilled] seconds [Longevity: ___ months] : The estimated remaining battery life is [unfilled] months [Sensing Amplitude ___mv] : sensing amplitude was [unfilled] mv [Lead Imp:  ___ohms] : lead impedance was [unfilled] ohms [___V @] : [unfilled] V [___ ms] : [unfilled] ms [Pace ___ %] : Pace [unfilled]% [de-identified] : [id number] [de-identified] : 3231-40Q [de-identified] : JEIMY [de-identified] : 5/17/11 [de-identified] : HV impedance 42\par \par No arrhythmias.\par \par Next check 1month as device is nearing TIANA.  [de-identified] : 70

## 2019-07-03 LAB — INR PPP: 2.03

## 2019-07-09 ENCOUNTER — APPOINTMENT (OUTPATIENT)
Dept: UROLOGY | Facility: CLINIC | Age: 84
End: 2019-07-09
Payer: MEDICARE

## 2019-07-09 VITALS
DIASTOLIC BLOOD PRESSURE: 62 MMHG | HEART RATE: 73 BPM | TEMPERATURE: 98 F | WEIGHT: 196 LBS | SYSTOLIC BLOOD PRESSURE: 106 MMHG | BODY MASS INDEX: 26.55 KG/M2 | HEIGHT: 72 IN

## 2019-07-09 PROCEDURE — 99213 OFFICE O/P EST LOW 20 MIN: CPT

## 2019-07-10 NOTE — PHYSICAL EXAM
[General Appearance - Well Developed] : well developed [General Appearance - Well Nourished] : well nourished [Normal Appearance] : normal appearance [Well Groomed] : well groomed [General Appearance - In No Acute Distress] : no acute distress [Abdomen Tenderness] : non-tender [Abdomen Soft] : soft [Costovertebral Angle Tenderness] : no ~M costovertebral angle tenderness [Urinary Bladder Findings] : the bladder was normal on palpation [Edema] : no peripheral edema [] : no respiratory distress [Exaggerated Use Of Accessory Muscles For Inspiration] : no accessory muscle use [Respiration, Rhythm And Depth] : normal respiratory rhythm and effort [Oriented To Time, Place, And Person] : oriented to person, place, and time [Mood] : the mood was normal [Affect] : the affect was normal [Not Anxious] : not anxious [No Focal Deficits] : no focal deficits [Normal Station and Gait] : the gait and station were normal for the patient's age [FreeTextEntry1] : LUIS deferred

## 2019-07-10 NOTE — HISTORY OF PRESENT ILLNESS
[FreeTextEntry1] : HPI: Mr Ryan, a 86 y.o gentleman with Alzheimers, hx of LUTS, currently on Oxybutynin ER 10 mg , doing ok, but dtr Juliet reports nocturia X 8 , denies incontinence. Evaluation of Voiding Symptoms:He is not able to answer questions related to his voiding, repeats question as is asked, but dtr believes no voiding ss. \par \par Pt comes in follow up urinary incontinence. Pts wife it has been much better since pt instructed to urinates every hour.\par \par Last visit PVR 99. Pt at that time he was not able to void in office. Today PVR was 0. \par

## 2019-07-10 NOTE — ASSESSMENT
[FreeTextEntry1] : Pt comes in follow up urinary incontinence. Pts wife it has been much better since pt instructed to urinates every hour.\par \par Last visit PVR 99. Pt at that time he was not able to void in office. Today PVR was 0. \par \par \par Plan\par cw trospium\par cw flomax\par wear depends as needed\par fu 6 months

## 2019-07-11 LAB — INR PPP: 2.18

## 2019-07-17 ENCOUNTER — OUTPATIENT (OUTPATIENT)
Dept: OUTPATIENT SERVICES | Facility: HOSPITAL | Age: 84
LOS: 1 days | End: 2019-07-17
Payer: MEDICARE

## 2019-07-17 PROCEDURE — 70450 CT HEAD/BRAIN W/O DYE: CPT | Mod: 26

## 2019-07-18 LAB — INR PPP: 1.85

## 2019-07-26 LAB — INR PPP: 1.75

## 2019-07-30 ENCOUNTER — APPOINTMENT (OUTPATIENT)
Dept: CARDIOLOGY | Facility: CLINIC | Age: 84
End: 2019-07-30
Payer: MEDICARE

## 2019-07-30 PROCEDURE — 93284 PRGRMG EVAL IMPLANTABLE DFB: CPT

## 2019-07-30 NOTE — PROCEDURE
[Sinus Bradycardia] : sinus bradycardia [No] : not [See Device Printout] : See device printout [VVIR] : VVIR [ICD] : Implantable cardioverter-defibrillator [Charge Time: ___ sec] : charge time was [unfilled] seconds [Longevity: ___ months] : The estimated remaining battery life is [unfilled] months [Sensing Amplitude ___mv] : sensing amplitude was [unfilled] mv [Lead Imp:  ___ohms] : lead impedance was [unfilled] ohms [___ ms] : [unfilled] ms [___V @] : [unfilled] V [Pace ___ %] : Pace [unfilled]% [de-identified] : JEIMY [de-identified] : 5/17/11 [de-identified] : 3231-40Q [de-identified] : [id number] [de-identified] : 70 [de-identified] : HV impedance 42\par \par No arrhythmias.\par \par Next check 1month as device is nearing TIANA.

## 2019-07-30 NOTE — PHYSICAL EXAM
[General Appearance - Well Developed] : well developed [General Appearance - Well Nourished] : well nourished [Normal Appearance] : normal appearance [Well Groomed] : well groomed [General Appearance - In No Acute Distress] : no acute distress [No Oral Pallor] : no oral pallor [No Deformities] : no deformities [No Oral Cyanosis] : no oral cyanosis

## 2019-08-01 LAB — INR PPP: 1.84

## 2019-08-08 LAB — INR PPP: 2.13

## 2019-08-12 ENCOUNTER — APPOINTMENT (OUTPATIENT)
Dept: UROLOGY | Facility: CLINIC | Age: 84
End: 2019-08-12
Payer: MEDICARE

## 2019-08-12 VITALS
HEART RATE: 71 BPM | WEIGHT: 195 LBS | TEMPERATURE: 98.5 F | BODY MASS INDEX: 26.41 KG/M2 | HEIGHT: 72 IN | DIASTOLIC BLOOD PRESSURE: 76 MMHG | SYSTOLIC BLOOD PRESSURE: 165 MMHG

## 2019-08-12 DIAGNOSIS — R32 UNSPECIFIED URINARY INCONTINENCE: ICD-10-CM

## 2019-08-12 LAB
BILIRUB UR QL STRIP: NEGATIVE
CLARITY UR: CLEAR
COLLECTION METHOD: NORMAL
GLUCOSE UR-MCNC: NEGATIVE
HCG UR QL: 0.2 EU/DL
HGB UR QL STRIP.AUTO: NEGATIVE
KETONES UR-MCNC: NEGATIVE
LEUKOCYTE ESTERASE UR QL STRIP: NEGATIVE
NITRITE UR QL STRIP: NEGATIVE
PH UR STRIP: 6
PROT UR STRIP-MCNC: NEGATIVE
SP GR UR STRIP: 1.01

## 2019-08-12 PROCEDURE — 99213 OFFICE O/P EST LOW 20 MIN: CPT | Mod: 25

## 2019-08-12 PROCEDURE — 81003 URINALYSIS AUTO W/O SCOPE: CPT | Mod: QW

## 2019-08-12 PROCEDURE — 51798 US URINE CAPACITY MEASURE: CPT

## 2019-08-12 NOTE — ASSESSMENT
[FreeTextEntry1] : Pt comes in with daughter with daughter stating he is having episodes of urinary leakage more so during the day. Pt states he is urinating well and has no other complaints. UA shows no signs of infection.    PVR 1.\par \par Plan\par cw current medication\par fu as scheduled

## 2019-08-12 NOTE — HISTORY OF PRESENT ILLNESS
[FreeTextEntry1] : Pt comes in with daughter with daughter stating he is having episodes of urinary leakage more so during the day. Pt states he is urinating well and has no other complaints. UA shows no signs of infection.     PVR 1.

## 2019-08-15 ENCOUNTER — RX RENEWAL (OUTPATIENT)
Age: 84
End: 2019-08-15

## 2019-08-16 LAB — INR PPP: 1.95

## 2019-08-22 LAB — INR PPP: 1.97

## 2019-08-27 ENCOUNTER — APPOINTMENT (OUTPATIENT)
Dept: CARDIOLOGY | Facility: CLINIC | Age: 84
End: 2019-08-27
Payer: MEDICARE

## 2019-08-27 ENCOUNTER — APPOINTMENT (OUTPATIENT)
Dept: ELECTROPHYSIOLOGY | Facility: CLINIC | Age: 84
End: 2019-08-27
Payer: MEDICARE

## 2019-08-27 VITALS
OXYGEN SATURATION: 99 % | BODY MASS INDEX: 27.13 KG/M2 | WEIGHT: 200 LBS | HEART RATE: 70 BPM | DIASTOLIC BLOOD PRESSURE: 84 MMHG | SYSTOLIC BLOOD PRESSURE: 182 MMHG

## 2019-08-27 PROCEDURE — 93284 PRGRMG EVAL IMPLANTABLE DFB: CPT

## 2019-08-27 PROCEDURE — 99214 OFFICE O/P EST MOD 30 MIN: CPT

## 2019-08-27 NOTE — PROCEDURE
[Sinus Bradycardia] : sinus bradycardia [No] : not [See Device Printout] : See device printout [ICD] : Implantable cardioverter-defibrillator [VVIR] : VVIR [Longevity: ___ months] : The estimated remaining battery life is [unfilled] months [Charge Time: ___ sec] : charge time was [unfilled] seconds [Pace ___ %] : Pace [unfilled]% [de-identified] : JEIMY [de-identified] : [id number] [de-identified] : 3231-40Q [de-identified] : 5/17/11 [de-identified] : 70 [de-identified] : Device at [facility]. [de-identified] : HV impedance 42\par \par No arrhythmias.\par \par Device at TIANA.  Will see Dr. Rivera today to arrange generator change.

## 2019-08-30 LAB — INR PPP: 2.46

## 2019-09-04 NOTE — DISCUSSION/SUMMARY
[FreeTextEntry1] : \par \par [employer] ICD was interrogated and is TIANA.  Device is 3231–40 q. implanted May 2017.  He has right ventricular as well as left ventricle leads which were tested and functioned normally.  This device is subjected battery device rate  Advisory.  The options were discussed with the patient's spouse.  He is mostly paced and has an escape rate less than 30 bpm.  Would favor replacement of the defibrillator with a pacemaker given his advanced dementia.  The patient spouse will discuss this with her son and then let us know before scheduling.

## 2019-09-04 NOTE — PHYSICAL EXAM
[General Appearance - In No Acute Distress] : no acute distress [General Appearance - Well Developed] : well developed [Normal Conjunctiva] : the conjunctiva exhibited no abnormalities [Normal Oral Mucosa] : normal oral mucosa [Normal Jugular Venous V Waves Present] : normal jugular venous V waves present [Heart Rate And Rhythm] : heart rate and rhythm were normal [Respiration, Rhythm And Depth] : normal respiratory rhythm and effort [Edema] : no peripheral edema present [Auscultation Breath Sounds / Voice Sounds] : lungs were clear to auscultation bilaterally [Abdomen Tenderness] : non-tender [Nail Clubbing] : no clubbing of the fingernails [] : no rash

## 2019-09-04 NOTE — REVIEW OF SYSTEMS
[Feeling Fatigued] : feeling fatigued [Confusion] : confusion was observed [Memory Lapses Or Loss] : memory lapses or loss [Shortness Of Breath] : no shortness of breath [Chest Pain] : no chest pain [Cough] : no cough [Urinary Frequency] : no change in urinary frequency [Dizziness] : no dizziness [Easy Bleeding] : no tendency for easy bleeding [Easy Bruising] : no tendency for easy bruising

## 2019-09-04 NOTE — HISTORY OF PRESENT ILLNESS
[FreeTextEntry1] : The patient is an 87-year-old man who is seen in follow-up evaluation for his device.  He has a [employer] implantable cardioverter defibrillator.  The device is programmed VVIR at 70 bpm he was recently seen in the office and the device was approaching elective replacement indicator.\par \par Patient has a prior history of hypertension is well controlled in the cardiomyopathy.  He denies any previous device shocks and also has not had any heart failure symptoms.  He has a history of atrial fibrillation for which he is anticoagulated.  Patient has not had any bleeding issues he has had memory issues.\par \par His current medications include aspirin Coumadin Keppra metoprolol\par \par There is no specific complaints.\par \par He has not had any recent echocardiogram.\par \par The review of systems was obtained from the family.\par

## 2019-09-05 LAB — INR PPP: 2.57

## 2019-09-10 ENCOUNTER — APPOINTMENT (OUTPATIENT)
Dept: CARDIOLOGY | Facility: CLINIC | Age: 84
End: 2019-09-10

## 2019-09-12 ENCOUNTER — OUTPATIENT (OUTPATIENT)
Dept: OUTPATIENT SERVICES | Facility: HOSPITAL | Age: 84
LOS: 1 days | End: 2019-09-12

## 2019-09-16 LAB — INR PPP: 2.12

## 2019-09-18 ENCOUNTER — OUTPATIENT (OUTPATIENT)
Dept: OUTPATIENT SERVICES | Facility: HOSPITAL | Age: 84
LOS: 1 days | End: 2019-09-18
Payer: MEDICARE

## 2019-09-18 PROCEDURE — 33264 RMVL & RPLCMT DFB GEN MLT LD: CPT

## 2019-09-19 LAB — INR PPP: 2.65

## 2019-09-24 ENCOUNTER — MEDICATION RENEWAL (OUTPATIENT)
Age: 84
End: 2019-09-24

## 2019-09-27 ENCOUNTER — APPOINTMENT (OUTPATIENT)
Dept: ELECTROPHYSIOLOGY | Facility: CLINIC | Age: 84
End: 2019-09-27
Payer: MEDICARE

## 2019-09-27 VITALS
SYSTOLIC BLOOD PRESSURE: 158 MMHG | BODY MASS INDEX: 29.16 KG/M2 | OXYGEN SATURATION: 99 % | HEART RATE: 72 BPM | DIASTOLIC BLOOD PRESSURE: 74 MMHG | WEIGHT: 215 LBS

## 2019-09-27 PROCEDURE — 99213 OFFICE O/P EST LOW 20 MIN: CPT

## 2019-09-27 RX ORDER — DIBASIC SODIUM PHOSPHATE, MONOBASIC POTASSIUM PHOSPHATE AND MONOBASIC SODIUM PHOSPHATE 852; 155; 130 MG/1; MG/1; MG/1
TABLET ORAL DAILY
Refills: 0 | Status: DISCONTINUED | COMMUNITY
End: 2019-09-27

## 2019-09-27 NOTE — DISCUSSION/SUMMARY
[FreeTextEntry1] : \par This [employer]  permanent pacemaker was interrogated and functioning normally. Wound is healing well\par \par RV pacing was 1 V at 0.6 ms and LV pacing was 0.75 V at 0.5 ms.  The patient's impedances were 850 ohms and 430 ohms respectively. The atrial port of the device was plugged.\par \par Remote follow-up and follow-up in the office in approximately 4-6 months.

## 2019-09-27 NOTE — PHYSICAL EXAM
[General Appearance - In No Acute Distress] : no acute distress [General Appearance - Well Developed] : well developed [Heart Sounds] : normal S1 and S2 [Edema] : no peripheral edema present [Healing Well] : healing well [Auscultation Breath Sounds / Voice Sounds] : lungs were clear to auscultation bilaterally [Respiration, Rhythm And Depth] : normal respiratory rhythm and effort [Bleeding] : no active bleeding [Serous Drainage] : no serous drainage [Erythema] : not erythematous [Warm] : not warm [Tender] : not tender [Indurated] : not indurated [Abdomen Tenderness] : non-tender [Fluctuant] : not fluctuant [Nail Clubbing] : no clubbing of the fingernails [Normal Oral Mucosa] : normal oral mucosa [Normal Jugular Venous V Waves Present] : normal jugular venous V waves present [] : no rash

## 2019-09-27 NOTE — HISTORY OF PRESENT ILLNESS
[FreeTextEntry1] : The patient is seen in follow-up status post replacement of his defibrillator to a permanent pacemaker.  In follow-up he is feeling well and denies any symptoms.  He is feeling well and has no chest pain, shortness of breath, cough, fever or pain at the operative site.  \par \par Patient has a prior history of hypertension is well controlled in the cardiomyopathy.  He denies any previous device shocks and also has not had any heart failure symptoms.  He has a history of atrial fibrillation for which he is anticoagulated.  Patient has not had any bleeding issues he has had memory issues.\par \par His current medications include aspirin Coumadin Keppra metoprolol\par \par

## 2019-09-27 NOTE — REVIEW OF SYSTEMS
[Fever] : no fever [Feeling Fatigued] : not feeling fatigued [Shortness Of Breath] : no shortness of breath [Cough] : no cough [Chest Pain] : no chest pain [Urinary Frequency] : no change in urinary frequency [Confusion] : confusion was observed [Dizziness] : no dizziness [Easy Bleeding] : no tendency for easy bleeding [Memory Lapses Or Loss] : memory lapses or loss [Easy Bruising] : no tendency for easy bruising

## 2019-09-27 NOTE — REASON FOR VISIT
[Follow-up Device Check] : follow-up device check visit [Family Member] : family member [Spouse] : spouse [Consultation] : a consultation regarding

## 2019-09-30 LAB — INR PPP: 2.35

## 2019-10-01 ENCOUNTER — MEDICATION RENEWAL (OUTPATIENT)
Age: 84
End: 2019-10-01

## 2019-10-03 LAB — INR PPP: 2.66

## 2019-10-11 LAB — INR PPP: 2.46

## 2019-10-17 LAB — INR PPP: 3.32

## 2019-10-18 ENCOUNTER — APPOINTMENT (OUTPATIENT)
Dept: CARDIOLOGY | Facility: CLINIC | Age: 84
End: 2019-10-18

## 2019-10-24 LAB — INR PPP: 2.02

## 2019-11-11 PROBLEM — E78.5 HLD (HYPERLIPIDEMIA): Status: ACTIVE | Noted: 2017-03-20

## 2019-11-11 NOTE — HISTORY OF PRESENT ILLNESS
[FreeTextEntry1] : Walks w walker, no exertional sx. There is no exertional chest discomfort or shortness of breath.\par \par Atrial fibrillation:   patient tolerating  a/c without difficulty.\par \par Hyperlipidemia: Continue statin therapy.\par \par CAD: asx Patient is taking aspirin 81 mg daily.

## 2019-11-11 NOTE — PHYSICAL EXAM
[General Appearance - Well Developed] : well developed [Well Groomed] : well groomed [General Appearance - Well Nourished] : well nourished [Normal Appearance] : normal appearance [Normal Conjunctiva] : the conjunctiva exhibited no abnormalities [General Appearance - In No Acute Distress] : no acute distress [No Deformities] : no deformities [Eyelids - No Xanthelasma] : the eyelids demonstrated no xanthelasmas [Normal Oral Mucosa] : normal oral mucosa [No Oral Pallor] : no oral pallor [No Oral Cyanosis] : no oral cyanosis [Normal Jugular Venous A Waves Present] : normal jugular venous A waves present [No Jugular Venous Ballesteros A Waves] : no jugular venous ballesteros A waves [Normal Jugular Venous V Waves Present] : normal jugular venous V waves present [] : no respiratory distress [Respiration, Rhythm And Depth] : normal respiratory rhythm and effort [Exaggerated Use Of Accessory Muscles For Inspiration] : no accessory muscle use [Heart Rate And Rhythm] : heart rate and rhythm were normal [Auscultation Breath Sounds / Voice Sounds] : lungs were clear to auscultation bilaterally [Heart Sounds] : normal S1 and S2 [Murmurs] : no murmurs present

## 2020-01-01 ENCOUNTER — APPOINTMENT (OUTPATIENT)
Dept: CARDIOLOGY | Facility: CLINIC | Age: 85
End: 2020-01-01
Payer: MEDICARE

## 2020-01-01 ENCOUNTER — APPOINTMENT (OUTPATIENT)
Dept: UROLOGY | Facility: CLINIC | Age: 85
End: 2020-01-01
Payer: MEDICARE

## 2020-01-01 ENCOUNTER — NON-APPOINTMENT (OUTPATIENT)
Age: 85
End: 2020-01-01

## 2020-01-01 ENCOUNTER — APPOINTMENT (OUTPATIENT)
Dept: UROLOGY | Facility: CLINIC | Age: 85
End: 2020-01-01

## 2020-01-01 ENCOUNTER — OUTPATIENT (OUTPATIENT)
Dept: OUTPATIENT SERVICES | Facility: HOSPITAL | Age: 85
LOS: 1 days | End: 2020-01-01

## 2020-01-01 ENCOUNTER — EMERGENCY (EMERGENCY)
Facility: HOSPITAL | Age: 85
LOS: 1 days | End: 2020-01-01
Payer: MEDICARE

## 2020-01-01 ENCOUNTER — APPOINTMENT (OUTPATIENT)
Dept: CARDIOLOGY | Facility: CLINIC | Age: 85
End: 2020-01-01

## 2020-01-01 ENCOUNTER — APPOINTMENT (OUTPATIENT)
Dept: RADIOLOGY | Facility: CLINIC | Age: 85
End: 2020-01-01
Payer: MEDICARE

## 2020-01-01 ENCOUNTER — EMERGENCY (EMERGENCY)
Facility: HOSPITAL | Age: 85
LOS: 1 days | End: 2020-01-01
Admitting: EMERGENCY MEDICINE
Payer: MEDICARE

## 2020-01-01 VITALS
OXYGEN SATURATION: 99 % | DIASTOLIC BLOOD PRESSURE: 50 MMHG | SYSTOLIC BLOOD PRESSURE: 142 MMHG | TEMPERATURE: 97.5 F | HEART RATE: 70 BPM | HEIGHT: 72 IN | BODY MASS INDEX: 29.66 KG/M2 | WEIGHT: 219 LBS

## 2020-01-01 VITALS
DIASTOLIC BLOOD PRESSURE: 70 MMHG | SYSTOLIC BLOOD PRESSURE: 140 MMHG | BODY MASS INDEX: 31.56 KG/M2 | OXYGEN SATURATION: 92 % | HEIGHT: 72 IN | TEMPERATURE: 97.6 F | WEIGHT: 233 LBS | HEART RATE: 72 BPM

## 2020-01-01 VITALS
SYSTOLIC BLOOD PRESSURE: 112 MMHG | WEIGHT: 233 LBS | BODY MASS INDEX: 31.6 KG/M2 | OXYGEN SATURATION: 96 % | HEART RATE: 70 BPM | DIASTOLIC BLOOD PRESSURE: 54 MMHG | TEMPERATURE: 97.5 F

## 2020-01-01 VITALS
DIASTOLIC BLOOD PRESSURE: 70 MMHG | HEART RATE: 73 BPM | SYSTOLIC BLOOD PRESSURE: 117 MMHG | BODY MASS INDEX: 25.73 KG/M2 | WEIGHT: 190 LBS | HEIGHT: 72 IN | TEMPERATURE: 98.3 F

## 2020-01-01 VITALS
BODY MASS INDEX: 31.56 KG/M2 | WEIGHT: 219 LBS | HEIGHT: 72 IN | SYSTOLIC BLOOD PRESSURE: 124 MMHG | HEIGHT: 72 IN | DIASTOLIC BLOOD PRESSURE: 71 MMHG | HEART RATE: 71 BPM | DIASTOLIC BLOOD PRESSURE: 82 MMHG | OXYGEN SATURATION: 95 % | SYSTOLIC BLOOD PRESSURE: 146 MMHG | HEART RATE: 70 BPM | WEIGHT: 233 LBS | BODY MASS INDEX: 29.66 KG/M2 | TEMPERATURE: 97.4 F

## 2020-01-01 VITALS
HEIGHT: 72 IN | OXYGEN SATURATION: 99 % | SYSTOLIC BLOOD PRESSURE: 160 MMHG | DIASTOLIC BLOOD PRESSURE: 80 MMHG | HEART RATE: 70 BPM | TEMPERATURE: 98 F

## 2020-01-01 VITALS
SYSTOLIC BLOOD PRESSURE: 130 MMHG | HEIGHT: 72 IN | OXYGEN SATURATION: 98 % | DIASTOLIC BLOOD PRESSURE: 70 MMHG | BODY MASS INDEX: 25.73 KG/M2 | HEART RATE: 68 BPM | WEIGHT: 190 LBS

## 2020-01-01 VITALS — HEIGHT: 72 IN | WEIGHT: 219 LBS | BODY MASS INDEX: 29.66 KG/M2

## 2020-01-01 DIAGNOSIS — Z87.898 PERSONAL HISTORY OF OTHER SPECIFIED CONDITIONS: ICD-10-CM

## 2020-01-01 DIAGNOSIS — Z86.79 PERSONAL HISTORY OF OTHER DISEASES OF THE CIRCULATORY SYSTEM: ICD-10-CM

## 2020-01-01 DIAGNOSIS — I50.9 HEART FAILURE, UNSPECIFIED: ICD-10-CM

## 2020-01-01 DIAGNOSIS — N40.1 BENIGN PROSTATIC HYPERPLASIA WITH LOWER URINARY TRACT SYMPMS: ICD-10-CM

## 2020-01-01 DIAGNOSIS — I42.8 OTHER CARDIOMYOPATHIES: ICD-10-CM

## 2020-01-01 DIAGNOSIS — Z86.39 PERSONAL HISTORY OF OTHER ENDOCRINE, NUTRITIONAL AND METABOLIC DISEASE: ICD-10-CM

## 2020-01-01 DIAGNOSIS — Z79.2 LONG TERM (CURRENT) USE OF ANTIBIOTICS: ICD-10-CM

## 2020-01-01 DIAGNOSIS — I48.91 UNSPECIFIED ATRIAL FIBRILLATION: ICD-10-CM

## 2020-01-01 DIAGNOSIS — R32 UNSPECIFIED URINARY INCONTINENCE: ICD-10-CM

## 2020-01-01 DIAGNOSIS — N13.8 BENIGN PROSTATIC HYPERPLASIA WITH LOWER URINARY TRACT SYMPMS: ICD-10-CM

## 2020-01-01 DIAGNOSIS — R39.9 UNSPECIFIED SYMPTOMS AND SIGNS INVOLVING THE GENITOURINARY SYSTEM: ICD-10-CM

## 2020-01-01 DIAGNOSIS — I10 ESSENTIAL (PRIMARY) HYPERTENSION: ICD-10-CM

## 2020-01-01 DIAGNOSIS — I47.2 VENTRICULAR TACHYCARDIA: ICD-10-CM

## 2020-01-01 DIAGNOSIS — I25.10 ATHEROSCLEROTIC HEART DISEASE OF NATIVE CORONARY ARTERY W/OUT ANGINA PECTORIS: ICD-10-CM

## 2020-01-01 DIAGNOSIS — E11.65 TYPE 2 DIABETES MELLITUS WITH HYPERGLYCEMIA: ICD-10-CM

## 2020-01-01 DIAGNOSIS — Z95.810 PRESENCE OF AUTOMATIC (IMPLANTABLE) CARDIAC DEFIBRILLATOR: ICD-10-CM

## 2020-01-01 DIAGNOSIS — K21.9 GASTRO-ESOPHAGEAL REFLUX DISEASE W/OUT ESOPHAGITIS: ICD-10-CM

## 2020-01-01 DIAGNOSIS — I07.2: ICD-10-CM

## 2020-01-01 DIAGNOSIS — I34.8 OTHER NONRHEUMATIC MITRAL VALVE DISORDERS: ICD-10-CM

## 2020-01-01 LAB
INR PPP: 1.75
INR PPP: 1.78
INR PPP: 1.87
INR PPP: 1.88
INR PPP: 1.89
INR PPP: 1.91
INR PPP: 1.93
INR PPP: 1.94
INR PPP: 1.99
INR PPP: 2.04
INR PPP: 2.14
INR PPP: 2.15
INR PPP: 2.18
INR PPP: 2.22
INR PPP: 2.24
INR PPP: 2.31
INR PPP: 2.33
INR PPP: 2.37
INR PPP: 2.37
INR PPP: 2.43
INR PPP: 2.44
INR PPP: 2.47
INR PPP: 2.49
INR PPP: 2.49
INR PPP: 2.52
INR PPP: 2.56
INR PPP: 2.59
INR PPP: 2.66
INR PPP: 2.69
INR PPP: 2.82
INR PPP: 2.87
INR PPP: 2.89
INR PPP: 2.91
INR PPP: 3.01
INR PPP: 3.03
INR PPP: 3.05
INR PPP: 3.09
INR PPP: 3.22
INR PPP: 3.29
INR PPP: 3.39
INR PPP: 3.4
INR PPP: 4.21

## 2020-01-01 PROCEDURE — 99214 OFFICE O/P EST MOD 30 MIN: CPT

## 2020-01-01 PROCEDURE — 99285 EMERGENCY DEPT VISIT HI MDM: CPT | Mod: 25

## 2020-01-01 PROCEDURE — 74176 CT ABD & PELVIS W/O CONTRAST: CPT | Mod: 26

## 2020-01-01 PROCEDURE — 71046 X-RAY EXAM CHEST 2 VIEWS: CPT

## 2020-01-01 PROCEDURE — 72125 CT NECK SPINE W/O DYE: CPT | Mod: 26

## 2020-01-01 PROCEDURE — 71045 X-RAY EXAM CHEST 1 VIEW: CPT | Mod: 26

## 2020-01-01 PROCEDURE — 72170 X-RAY EXAM OF PELVIS: CPT | Mod: 26

## 2020-01-01 PROCEDURE — 99213 OFFICE O/P EST LOW 20 MIN: CPT

## 2020-01-01 PROCEDURE — 70450 CT HEAD/BRAIN W/O DYE: CPT | Mod: 26

## 2020-01-01 PROCEDURE — 73090 X-RAY EXAM OF FOREARM: CPT | Mod: 26,LT

## 2020-01-01 PROCEDURE — 93010 ELECTROCARDIOGRAM REPORT: CPT

## 2020-01-01 PROCEDURE — 93000 ELECTROCARDIOGRAM COMPLETE: CPT | Mod: 59

## 2020-01-01 PROCEDURE — 93306 TTE W/DOPPLER COMPLETE: CPT

## 2020-01-01 PROCEDURE — 93281 PM DEVICE PROGR EVAL MULTI: CPT

## 2020-01-01 PROCEDURE — 93000 ELECTROCARDIOGRAM COMPLETE: CPT

## 2020-01-01 PROCEDURE — 93280 PM DEVICE PROGR EVAL DUAL: CPT

## 2020-01-01 PROCEDURE — 71250 CT THORAX DX C-: CPT | Mod: 26

## 2020-01-01 PROCEDURE — 73080 X-RAY EXAM OF ELBOW: CPT | Mod: 26,LT

## 2020-01-01 PROCEDURE — 92950 HEART/LUNG RESUSCITATION CPR: CPT

## 2020-01-01 PROCEDURE — 99285 EMERGENCY DEPT VISIT HI MDM: CPT

## 2020-01-01 RX ORDER — TROSPIUM CHLORIDE 20 MG/1
20 TABLET, FILM COATED ORAL
Qty: 180 | Refills: 3 | Status: ACTIVE | COMMUNITY
Start: 2020-01-01 | End: 1900-01-01

## 2020-01-01 RX ORDER — FUROSEMIDE 40 MG/1
40 TABLET ORAL DAILY
Qty: 14 | Refills: 0 | Status: DISCONTINUED | COMMUNITY
Start: 2020-01-01 | End: 2020-01-01

## 2020-01-01 RX ORDER — TAMSULOSIN HYDROCHLORIDE 0.4 MG/1
0.4 CAPSULE ORAL
Qty: 90 | Refills: 3 | Status: ACTIVE | COMMUNITY
Start: 2019-05-24 | End: 1900-01-01

## 2020-01-01 RX ORDER — WARFARIN 5 MG/1
5 TABLET ORAL DAILY
Qty: 90 | Refills: 3 | Status: ACTIVE | COMMUNITY
Start: 2020-01-01 | End: 1900-01-01

## 2020-01-01 RX ORDER — WARFARIN 5 MG/1
5 TABLET ORAL
Qty: 90 | Refills: 0 | Status: ACTIVE | COMMUNITY
Start: 2018-01-22 | End: 1900-01-01

## 2020-01-01 RX ORDER — RAMIPRIL 5 MG/1
5 CAPSULE ORAL DAILY
Qty: 90 | Refills: 1 | Status: ACTIVE | COMMUNITY
Start: 2020-01-01 | End: 1900-01-01

## 2020-01-01 RX ORDER — TROSPIUM CHLORIDE 60 MG/1
60 CAPSULE, EXTENDED RELEASE ORAL
Qty: 90 | Refills: 3 | Status: ACTIVE | COMMUNITY
Start: 2018-06-23 | End: 1900-01-01

## 2020-01-26 NOTE — REASON FOR VISIT
[Follow-up Device Check] : follow-up device check visit [Follow-Up - Clinic] : a clinic follow-up of

## 2020-01-27 NOTE — PROCEDURE
[No] : not [Sinus Bradycardia] : sinus bradycardia [See Device Printout] : See device printout [CRT-P] : Cardiac resynchronization therapy pacemaker [VVIR] : VVIR [Charge Time: ___ sec] : charge time was [unfilled] seconds [Threshold Testing Performed] : Threshold testing was performed [Lead Imp:  ___ohms] : lead impedance was [unfilled] ohms [Sensing Amplitude ___mv] : sensing amplitude was [unfilled] mv [___V @] : [unfilled] V [___ ms] : [unfilled] ms [Pace ___ %] : Pace [unfilled]% [de-identified] : [employer] [de-identified] : Quadra Allure MP [id number] CRT-P [de-identified] : [id number] [de-identified] : 09/18/2019 [de-identified] : 70 [de-identified] : 5.2-5.7 years [de-identified] : No arrhythmias.\par \par LV amplitued decreased to 1.5V based on threshold testing.  \par \par Recheck in 3 months.

## 2020-02-25 NOTE — HISTORY OF PRESENT ILLNESS
[FreeTextEntry1] : Pt comes in with daughter with daughter stating he is having episodes of occasional urinary leakage. Pt is currently on flomax and trospium. Pt states he urinates well.  \par \par

## 2020-02-25 NOTE — ASSESSMENT
[FreeTextEntry1] : Pt comes in with daughter with daughter stating he is having episodes of occasional urinary leakage. Pt is currently on flomax and trospium. Pt states he urinates well.  \par \par Plan\par cw flomax and trospium\par fu 1 year

## 2020-02-25 NOTE — PHYSICAL EXAM
[General Appearance - Well Developed] : well developed [General Appearance - Well Nourished] : well nourished [Normal Appearance] : normal appearance [Well Groomed] : well groomed [Abdomen Soft] : soft [General Appearance - In No Acute Distress] : no acute distress [Abdomen Tenderness] : non-tender [Costovertebral Angle Tenderness] : no ~M costovertebral angle tenderness [Urinary Bladder Findings] : the bladder was normal on palpation [Edema] : no peripheral edema [] : no respiratory distress [Respiration, Rhythm And Depth] : normal respiratory rhythm and effort [Exaggerated Use Of Accessory Muscles For Inspiration] : no accessory muscle use [Mood] : the mood was normal [Affect] : the affect was normal [Oriented To Time, Place, And Person] : oriented to person, place, and time [Not Anxious] : not anxious [Normal Station and Gait] : the gait and station were normal for the patient's age [No Focal Deficits] : no focal deficits

## 2020-06-03 PROBLEM — I34.8 OTHER NONRHEUMATIC MITRAL VALVE DISORDERS: Status: RESOLVED | Noted: 2017-10-09 | Resolved: 2020-01-01

## 2020-06-03 PROBLEM — Z86.79 HISTORY OF CARDIAC ARRHYTHMIA: Status: RESOLVED | Noted: 2017-10-09 | Resolved: 2020-01-01

## 2020-06-03 PROBLEM — I42.8 OTHER PRIMARY CARDIOMYOPATHIES: Status: RESOLVED | Noted: 2017-10-09 | Resolved: 2020-01-01

## 2020-06-03 PROBLEM — Z87.898 HISTORY OF NOCTURIA: Status: RESOLVED | Noted: 2018-06-23 | Resolved: 2020-01-01

## 2020-06-03 PROBLEM — Z86.79 HISTORY OF HYPOTENSION: Status: RESOLVED | Noted: 2017-10-09 | Resolved: 2020-01-01

## 2020-06-03 PROBLEM — R39.9 LOWER URINARY TRACT SYMPTOMS (LUTS): Status: RESOLVED | Noted: 2018-06-23 | Resolved: 2020-01-01

## 2020-06-03 PROBLEM — Z79.2 LONG TERM (CURRENT) USE OF ANTIBIOTICS: Status: RESOLVED | Noted: 2017-10-09 | Resolved: 2020-01-01

## 2020-06-03 PROBLEM — I47.2 VENTRICULAR TACHYCARDIA (PAROXYSMAL): Status: ACTIVE | Noted: 2017-10-09

## 2020-06-03 PROBLEM — I07.2: Status: RESOLVED | Noted: 2017-10-09 | Resolved: 2020-01-01

## 2020-06-03 PROBLEM — Z86.39 HISTORY OF DEHYDRATION: Status: RESOLVED | Noted: 2017-10-09 | Resolved: 2020-01-01

## 2020-06-03 PROBLEM — Z87.898 HISTORY OF URINARY FREQUENCY: Status: RESOLVED | Noted: 2018-06-23 | Resolved: 2020-01-01

## 2020-06-03 NOTE — PROCEDURE
[No] : not [Sinus Bradycardia] : sinus bradycardia [See Device Printout] : See device printout [CRT-P] : Cardiac resynchronization therapy pacemaker [VVIR] : VVIR [Charge Time: ___ sec] : charge time was [unfilled] seconds [Threshold Testing Performed] : Threshold testing was performed [Lead Imp:  ___ohms] : lead impedance was [unfilled] ohms [___V @] : [unfilled] V [Sensing Amplitude ___mv] : sensing amplitude was [unfilled] mv [___ ms] : [unfilled] ms [Pace ___ %] : Pace [unfilled]% [de-identified] : [employer] [de-identified] : Quadra Allure MP RF [id number] CRT-P [de-identified] : [id number] [de-identified] : 5.5-6.0 years [de-identified] : 70 [de-identified] : 09/18/2019 [de-identified] : No arrhythmias.\par \par Recheck in 3 months.

## 2020-06-12 PROBLEM — I10 ESSENTIAL (PRIMARY) HYPERTENSION: Status: ACTIVE | Noted: 2017-10-09

## 2020-06-12 PROBLEM — Z95.810 ICD (IMPLANTABLE CARDIOVERTER-DEFIBRILLATOR) IN PLACE: Status: ACTIVE | Noted: 2017-12-15

## 2020-06-15 NOTE — PHYSICAL EXAM
[General Appearance - In No Acute Distress] : no acute distress [Normal Conjunctiva] : the conjunctiva exhibited no abnormalities [General Appearance - Well Developed] : well developed [Normal Oral Mucosa] : normal oral mucosa [Respiration, Rhythm And Depth] : normal respiratory rhythm and effort [Normal Jugular Venous V Waves Present] : normal jugular venous V waves present [Edema] : no peripheral edema present [Auscultation Breath Sounds / Voice Sounds] : lungs were clear to auscultation bilaterally [Heart Sounds] : normal S1 and S2 [Abdomen Tenderness] : non-tender [Nail Clubbing] : no clubbing of the fingernails [] : no rash [Healing Well] : healing well [Serous Drainage] : no serous drainage [Bleeding] : no active bleeding [Erythema] : not erythematous [Warm] : not warm [Tender] : not tender [Fluctuant] : not fluctuant [Indurated] : not indurated

## 2020-06-15 NOTE — HISTORY OF PRESENT ILLNESS
[FreeTextEntry1] : 88 old gentleman with history of diabetes, dyslipidemia, seizure disorder ,hypertension, obesity, chronic atrial fibrillation on Coumadin, nonischemic cardiomyopathy and status post PPM implantation (ICD was replaced to PPM ) , diabetic neuropathy brought in for follow-up by his daughter. He is status post replacement of his defibrillator to a Bi vent permanent pacemaker.  \par \par He was previously experiencing increased short of breath / LLANES. no assoc. chest pain. Which is the reason the patient's wife requested today's office visit. \par \par During my exam, He denies current chest pain, pressure, palpitations, unusual shortness of breath, orthopnea, LE edema, lightheadedness, dizziness, near syncope or syncope.\par  \par rapid integration of PPM, no recorded events. last full integration on 6/3/20. \par \par

## 2020-06-15 NOTE — ADDENDUM
[FreeTextEntry1] : Please note the patient was reviewed with the NP.\par I was physically present during the service of the patient\par I was directly involved in the management plan and recommendations of care provided to the patient. \par I personally reviewed the history and physical exam and plan as documented by the NP above.\par \par Irwin Van DO, FACC, RPVI\par Cardiologist\par 06/15/2020

## 2020-06-15 NOTE — REVIEW OF SYSTEMS
[Confusion] : confusion was observed [Memory Lapses Or Loss] : memory lapses or loss [Fever] : no fever [Feeling Fatigued] : not feeling fatigued [Chest Pain] : no chest pain [Shortness Of Breath] : no shortness of breath [Cough] : no cough [Urinary Frequency] : no change in urinary frequency [Dizziness] : no dizziness [Easy Bruising] : no tendency for easy bruising [Easy Bleeding] : no tendency for easy bleeding

## 2020-06-15 NOTE — ASSESSMENT
[FreeTextEntry1] : This is a 88 year M with the above PMH and below problems were addressed during today's cardiovascular care visit. Patient verbalizes they understand the plan and any questions and concerns were addressed.\par \par History of cardiomyopathy -no evidence of volume overload or weight gain, continue medications \par \par Chronic atrial fibrillation -continue Coumadin with INR goal between 2-3, refill sent to pharmacy \par \par Hypertension -uncontrolled , continue with  ramipril 5 mg daily to his regimen, BMP  6/11/2020 reviewed \par \par PPM -appropriately functioning device, no recent events\par \par Follow up with our office in 6  months unless otherwise indicated,\par Discussed red flag symptoms, which would warrant sooner or emergent medical evaluation.\par \par Sincerely,\par \par MARY IveyC \par Patients history, testing, and plan reviewed with supervising MD: Dr. Irwin Van\par

## 2020-06-15 NOTE — REASON FOR VISIT
[Consultation] : a consultation regarding [Atrial Fibrillation] : atrial fibrillation [Heart Failure] : congestive heart failure [Coronary Artery Disease] : coronary artery disease [Cardiomyopathy] : cardiomyopathy [Hyperlipidemia] : hyperlipidemia [Hypertension] : hypertension [Family Member] : family member [FreeTextEntry1] : BiV PPM

## 2020-06-19 PROBLEM — E11.65 TYPE 2 DIABETES MELLITUS WITH HYPERGLYCEMIA: Status: ACTIVE | Noted: 2017-10-09

## 2020-06-19 PROBLEM — I50.9 CHF (CONGESTIVE HEART FAILURE): Status: ACTIVE | Noted: 2017-10-09

## 2020-06-22 NOTE — HISTORY OF PRESENT ILLNESS
[FreeTextEntry1] : KAVON GRANT is a 88 year old male with a past medical history of diabetes, dyslipidemia, seizure disorder ,hypertension, obesity, chronic atrial fibrillation on Coumadin, nonischemic cardiomyopathy and status post PPM implantation (ICD was replaced to PPM ) , diabetic neuropathy. He is status post replacement of his defibrillator to a Bi vent permanent pacemaker.  \par \par \par Last seen 6/12/20. In the interim there have been no hospitalizations or procedures. Presents today 6/19/20 with worsening LLANES, some BLLE swelling left greater than right, BL bruising of arms and back. Denies CP, palpitations, dizziness, lightheadedness, syncope, near syncope, and claudication. He is in a wheelchair and not on a formal exercise regimen.\par \par \par \par Testing:\par \par EKG 6/19/20: VPaced with a PVC at 75 bpm, QTc 482ms, nonspecific ST-T wave abnormalities \par \par INR yesterday 6/18/20 3.39. Advise Coumadin to be 7.5 mg twice a week and 5 all others (one day of 7.5 was decreased).\par \par Echo 6/3/20: EF 45%. Mild MS. Mod to mod AR. moderately dilated LA. Mild LVE. Mild global LVSF, Mild DALIA. Severe TR. Minimal MA. Compared with previous echo 4/6/10, increase in MR, TR, and mild MS.\par \par Last full device integration on 6/3/20. \par \par Carotids 10/6/15: Unremarkable.\par \par Nuclear stress test 12/15/14: Nondiagnostic for ischemia by EKG. Normal myocardial perfusion and wall motion study.\par

## 2020-06-22 NOTE — PHYSICAL EXAM
[General Appearance - Well Developed] : well developed [Normal Conjunctiva] : the conjunctiva exhibited no abnormalities [General Appearance - In No Acute Distress] : no acute distress [Respiration, Rhythm And Depth] : normal respiratory rhythm and effort [Normal Jugular Venous V Waves Present] : normal jugular venous V waves present [Normal Oral Mucosa] : normal oral mucosa [Auscultation Breath Sounds / Voice Sounds] : lungs were clear to auscultation bilaterally [Heart Sounds] : normal S1 and S2 [Abdomen Tenderness] : non-tender [Nail Clubbing] : no clubbing of the fingernails [] : no rash [Healing Well] : healing well [FreeTextEntry1] : BLLE edema [Bleeding] : no active bleeding [Serous Drainage] : no serous drainage [Warm] : not warm [Erythema] : not erythematous [Fluctuant] : not fluctuant [Indurated] : not indurated [Tender] : not tender [FreeTextEntry2] : Bruising to BL arms and back

## 2020-06-22 NOTE — ASSESSMENT
[FreeTextEntry1] : KAVON GRANT is a 88 year old M who presents today Jun 19, 2020 with the above history and the following active issues:\par \par \par History of cardiomyopathy - with new lower extremity swelling. Start Furosemide. Compression stockings.\par \par Chronic atrial fibrillation -continue Coumadin with INR goal between 2-3. Rate controlled today.\par \par Hypertension - /82 today. Starting Lasix as above for lower extremity edema.\par \par PPM -appropriately functioning device, no recent events\par \par Bruising: Labs ordered.\par \par F/U after testing to review results.\par Discussed red flag symptoms, which would warrant sooner or emergent medical evaluation.\par Any questions and concerns were addressed and resolved.\par \par Sincerely,\par Karla Negron [facility]\par Patient's history, testing, and plan was reviewed with supervising physician, Dr. Gonzalo López\par

## 2020-06-22 NOTE — REVIEW OF SYSTEMS
[Memory Lapses Or Loss] : memory lapses or loss [Confusion] : confusion was observed [Dyspnea on exertion] : dyspnea during exertion [Lower Ext Edema] : lower extremity edema [Fever] : no fever [Shortness Of Breath] : no shortness of breath [Feeling Fatigued] : not feeling fatigued [Chest Pain] : no chest pain [Cough] : no cough [Urinary Frequency] : no change in urinary frequency [Dizziness] : no dizziness [Easy Bleeding] : no tendency for easy bleeding [Easy Bruising] : no tendency for easy bruising [FreeTextEntry1] : Bruising BL arms and back

## 2020-06-26 PROBLEM — I48.91 AFIB: Status: ACTIVE | Noted: 2017-03-20

## 2020-06-26 PROBLEM — K21.9 GERD (GASTROESOPHAGEAL REFLUX DISEASE): Status: ACTIVE | Noted: 2017-03-20

## 2020-06-26 PROBLEM — I25.10 ATHEROSCLEROSIS OF CORONARY ARTERY OF NATIVE HEART WITHOUT ANGINA PECTORIS: Status: ACTIVE | Noted: 2017-10-09

## 2020-06-26 NOTE — PHYSICAL EXAM
[Normal Appearance] : normal appearance [General Appearance - Well Developed] : well developed [Well Groomed] : well groomed [No Deformities] : no deformities [General Appearance - Well Nourished] : well nourished [General Appearance - In No Acute Distress] : no acute distress [Normal Conjunctiva] : the conjunctiva exhibited no abnormalities [Normal Oral Mucosa] : normal oral mucosa [Eyelids - No Xanthelasma] : the eyelids demonstrated no xanthelasmas [No Oral Pallor] : no oral pallor [No Oral Cyanosis] : no oral cyanosis [Normal Jugular Venous V Waves Present] : normal jugular venous V waves present [No Jugular Venous Ballesteros A Waves] : no jugular venous ballesteros A waves [Normal Jugular Venous A Waves Present] : normal jugular venous A waves present [] : no respiratory distress [Respiration, Rhythm And Depth] : normal respiratory rhythm and effort [Exaggerated Use Of Accessory Muscles For Inspiration] : no accessory muscle use [Heart Rate And Rhythm] : heart rate and rhythm were normal [Auscultation Breath Sounds / Voice Sounds] : lungs were clear to auscultation bilaterally [Murmurs] : no murmurs present [Heart Sounds] : normal S1 and S2

## 2020-06-26 NOTE — HISTORY OF PRESENT ILLNESS
[FreeTextEntry1] : CAD: The patient has some dyspnea on moderate exertion.  Overall declines further investigation.  Continue aspirin therapy.\par \par Atrial fibrillation: The patient is tolerating anticoagulation.\par \par GERD: None recently

## 2020-09-18 PROBLEM — N40.1 BPH WITH OBSTRUCTION/LOWER URINARY TRACT SYMPTOMS: Status: ACTIVE | Noted: 2019-05-24

## 2020-09-18 PROBLEM — R32 URINARY INCONTINENCE: Status: ACTIVE | Noted: 2020-01-01

## 2020-09-18 NOTE — HISTORY OF PRESENT ILLNESS
[FreeTextEntry1] : Pt comes in follow up urinary incontinence. As per daughter pt has been having more episodes of night time bed wetting at night. 1-2 times since last visit. Also an episode of leaking while on the couch. As per daughter she feels it is bc he does not want to get up. After speaking with pt he does admit to sometimes being lazy and not wanting to get up to go to the bathroom.

## 2020-09-18 NOTE — ASSESSMENT
[FreeTextEntry1] : Plan\par c/w flomax and trospium\par use a urinal bedside as needed \par fu in Feb as schedule

## 2020-12-16 ENCOUNTER — APPOINTMENT (OUTPATIENT)
Dept: CARDIOLOGY | Facility: CLINIC | Age: 85
End: 2020-12-16

## 2020-12-22 ENCOUNTER — APPOINTMENT (OUTPATIENT)
Dept: CARDIOLOGY | Facility: CLINIC | Age: 85
End: 2020-12-22

## 2021-02-23 ENCOUNTER — APPOINTMENT (OUTPATIENT)
Dept: UROLOGY | Facility: CLINIC | Age: 86
End: 2021-02-23